# Patient Record
Sex: MALE | Employment: STUDENT | URBAN - METROPOLITAN AREA
[De-identification: names, ages, dates, MRNs, and addresses within clinical notes are randomized per-mention and may not be internally consistent; named-entity substitution may affect disease eponyms.]

---
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
## 2020-07-13 NOTE — PATIENT DISCUSSION
GLAUCOMA:  I have talked with the patient about my impressions, explained our treatment plan, and have answered all questions and patient understands. Continue present eye drops. Add Timolol OS BID. Patient to follow up with me in 6 months.

## 2020-07-15 ENCOUNTER — OFFICE VISIT (OUTPATIENT)
Dept: ORTHOPEDIC SURGERY | Age: 20
End: 2020-07-15

## 2020-07-15 VITALS
RESPIRATION RATE: 16 BRPM | BODY MASS INDEX: 21.68 KG/M2 | OXYGEN SATURATION: 99 % | SYSTOLIC BLOOD PRESSURE: 122 MMHG | TEMPERATURE: 98.4 F | WEIGHT: 178 LBS | HEART RATE: 80 BPM | HEIGHT: 76 IN | DIASTOLIC BLOOD PRESSURE: 75 MMHG

## 2020-07-15 DIAGNOSIS — M25.511 RIGHT SHOULDER PAIN, UNSPECIFIED CHRONICITY: ICD-10-CM

## 2020-07-15 DIAGNOSIS — G25.89 SCAPULAR DYSKINESIS: Primary | ICD-10-CM

## 2020-07-15 RX ORDER — MELOXICAM 15 MG/1
15 TABLET ORAL
Qty: 30 TAB | Refills: 1 | Status: SHIPPED | OUTPATIENT
Start: 2020-07-15

## 2020-07-15 RX ORDER — BACLOFEN 10 MG/1
10 TABLET ORAL 3 TIMES DAILY
Qty: 60 TAB | Refills: 1 | Status: SHIPPED | OUTPATIENT
Start: 2020-07-15

## 2020-07-15 NOTE — PROGRESS NOTES
Chuyita Lopez  2000   Chief Complaint   Patient presents with    Shoulder Pain     right shoulder pain        HISTORY OF PRESENT ILLNESS  Chuyita Lopez is a 20 y.o. male who presents today for evaluation of right shoulder pain. Patient rates pain as 4/10 today. Pain has been present for 2 weeks. Pt denies any specific injury. Pt reports popping and grinding sensations. Has been taking Ibuprofen with some relief. Pt notes pain with driving, [place] and [place] pain with this. He localizes the pain to the shoulder blade region. Patient denies any fever, chills, chest pain, shortness of breath or calf pain. The remainder of the review of systems is negative. There are no new illness or injuries to report since last seen in the office. There are no changes to medications, allergies, family or social history. Pain Assessment  7/15/2020   Location of Pain Shoulder   Location Modifiers Right   Severity of Pain 4   Quality of Pain Popping;Grinding; Throbbing   Duration of Pain A few hours   Frequency of Pain Intermittent   Aggravating Factors Bending;Stretching;Straightening   Limiting Behavior -   Relieving Factors Nothing   Result of Injury No   Work-Related Injury -   Type of Injury -       PHYSICAL EXAM:   Visit Vitals  /75 (BP 1 Location: Left arm, BP Patient Position: Sitting)   Pulse 80   Temp 98.4 °F (36.9 °C) (Oral)   Resp 16   Ht 6' 4\" (1.93 m)   Wt 178 lb (80.7 kg)   SpO2 99%   BMI 21.67 kg/m²     The patient is a well-developed, well-nourished male   in no acute distress. The patient is alert and oriented times three. The patient is alert and oriented times three. Mood and affect are normal.  LYMPHATIC: lymph nodes are not enlarged and are within normal limits  SKIN: normal in color and non tender to palpation. There are no bruises or abrasions noted. NEUROLOGICAL: Motor sensory exam is within normal limits. Reflexes are equal bilaterally.  There is normal sensation to pinprick and light touch  MUSCULOSKELETAL:   Examination Right shoulder   Skin Intact   AC joint tenderness -   Biceps tenderness -   Forward flexion/Elevation    Active abduction    Glenohumeral abduction 90   External rotation ROM 90   Internal rotation ROM 70   Apprehension -   Janets Relocation -   Jerk -   Load and Shift -   Obriens -   Speeds -   Impingement sign -   Supraspinatus/Empty Can -, 5/5   External Rotation Strength -, 5/5   Lift Off/Belly Press -, 5/5   Neurovascular Intact     Examination Neck   Skin Intact   Tenderness, Paracervical +   Paracervical spasms  +   Flexion Decreased 25%   Extension Decreased 25%   Lateral bend left Normal   Lateral bend right Normal   Masses -   Spurling sign -   Biceps reflex Normal   Triceps reflex Normal   Brachioradialis reflex Normal   Sensation Normal   Slight winging of the scapula on the right side with    Scapular muscle spasms    IMAGING: XR of right shoulder obtained in the office dated 7/15/2020 was reviewed and read by Dr. Cale Yancey: No acute abnormalities       IMPRESSION:      ICD-10-CM ICD-9-CM    1. Scapular dyskinesis  G25.89 781.3 meloxicam (Mobic) 15 mg tablet      baclofen (LIORESAL) 10 mg tablet      REFERRAL TO PHYSICAL THERAPY   2. Right shoulder pain, unspecified chronicity  M25.511 719.41 AMB POC XRAY, SHOULDER; COMPLETE, 2+        PLAN:   1. Pt presents today with right shoulder pain likely coming from scapular dyskinesis and I would like for him to try PT with dry needling. Was also given prescriptions for Mobic and Baclofen today. Will see him back in 3 weeks. Risk factors include: n/a   2. No ultrasound exam indicated today  3. No cortisone injection indicated today   4. Yes Physical/Occupational Therapy indicated today  5. No diagnostic test indicated today:   6. No durable medical equipment indicated today  7. No referral indicated today   8. Yes medications indicated today: MOBIC & BACLOFEN  9.  No Narcotic indicated today       RTC 3 weeks      Scribed by Sriram Sinha [address]) as dictated by Carl Camarillo MD    I, Dr. Carl Camarillo, confirm that all documentation is accurate.     Carl Camarillo M.D.   [facility]

## 2020-07-15 NOTE — PROGRESS NOTES
Verbal order with read back given by Dr. Elvie Lozano to sign order for PT entered by Leonora Dinero ([address]).

## 2020-11-06 ENCOUNTER — IMPORTED ENCOUNTER (OUTPATIENT)
Dept: URBAN - NONMETROPOLITAN AREA CLINIC 1 | Facility: CLINIC | Age: 20
End: 2020-11-06

## 2020-11-06 PROBLEM — H52.03: Noted: 2020-11-06

## 2020-11-06 PROBLEM — H52.223: Noted: 2020-11-06

## 2020-11-06 PROCEDURE — 92004 COMPRE OPH EXAM NEW PT 1/>: CPT

## 2020-11-06 PROCEDURE — 92015 DETERMINE REFRACTIVE STATE: CPT

## 2020-11-06 NOTE — PATIENT DISCUSSION
Hyperopia/Astigmatism - new spectacle Rx issued - continue to monitor yearly; 's Notes: PCP Jaleesa Ayoub

## 2021-05-11 NOTE — PATIENT DISCUSSION
Stopped Today: neomycin-polymyxin-dexameth (neomycin-polymyxin-dexameth): ointment: 3.5-10,000-0.1 mg-unit/g-%

## 2021-05-11 NOTE — PATIENT DISCUSSION
New Prescription: neomycin-polymyxin-dexameth (neomycin-polymyxin-dexameth): ointment: 3.5-10,000-0.1 mg-unit/g-% 1 a small amount once a day on eyelid 05-

## 2021-08-04 NOTE — PATIENT DISCUSSION
GLAUCOMA SUSPECT-C/D: The patient is a glaucoma suspect because of suspicious appearance of the optic disc(s) OS>OD. IOP WNL today 19/20. (-) Family hx. RTC for HVF/PACH/OCT RNFL.

## 2022-03-26 NOTE — TELEPHONE ENCOUNTER
Pt's mom called stating the  at pt's school would like a statement from Dr. Mike Infante stating that pt is able to do light practice. Please advise Abi Schroeder @ [phone] when note is ready for . [email or web address]

## 2022-04-09 ASSESSMENT — TONOMETRY
OS_IOP_MMHG: 15
OD_IOP_MMHG: 15

## 2022-04-09 ASSESSMENT — VISUAL ACUITY
OU_SC: 20/20
OD_CC: 20/15
OS_CC: 20/15

## 2022-07-01 NOTE — PATIENT DISCUSSION
Discussed she is ok to continue follow up care yearly. Discussed continuation of care with a general ophthalmologist. Could see Dr. Cameron Junior or Dr. Jennifer Manning.

## 2022-07-12 NOTE — PATIENT DISCUSSION
Discussed she is ok to continue follow up care yearly. Discussed continuation of care with a general ophthalmologist. Could see Dr. Anna Blackburn or Dr. Rio Michael.

## 2022-08-10 NOTE — PATIENT DISCUSSION
Discussed with patient that glaucoma is a lifelong disease and that lack of follow up visits will lead to permanent and irreversible blindness. Patient expressed understanding.

## 2022-08-10 NOTE — PATIENT DISCUSSION
GLAUCOMA SUSPECT-C/D: The patient is a glaucoma suspect because of suspicious appearance of the optic disc(s) OS>OD. IOP borderline today 20/20. (-) Family hx. RTC for HVF/OCT RNFL as scheduled.

## 2022-09-06 NOTE — PATIENT DISCUSSION
OCT RNFL (09/2022) WNL OU. HVF (09/2022) slightly unreliable, does not appear glaucomatous OU. Will call patient with results. Keep scheduled appointment.

## 2023-04-28 ENCOUNTER — ESTABLISHED PATIENT (OUTPATIENT)
Dept: RURAL CLINIC 1 | Facility: CLINIC | Age: 23
End: 2023-04-28

## 2023-04-28 DIAGNOSIS — H52.03: ICD-10-CM

## 2023-04-28 DIAGNOSIS — H52.223: ICD-10-CM

## 2023-04-28 PROCEDURE — 92014 COMPRE OPH EXAM EST PT 1/>: CPT

## 2023-04-28 PROCEDURE — 92015 DETERMINE REFRACTIVE STATE: CPT

## 2023-04-28 ASSESSMENT — VISUAL ACUITY
OD_CC: 20/20
OS_CC: 20/20

## 2023-04-28 ASSESSMENT — TONOMETRY
OD_IOP_MMHG: 17
OS_IOP_MMHG: 17

## 2025-05-09 NOTE — TELEPHONE ENCOUNTER
Occupational Therapy    Patient not seen in therapy.     Patient politely asked occupational therapy to return later. Will follow up later schedule permitting                                Therapy procedure time and total treatment time can be found documented on the Time Entry flowsheet   Spoke with patients mother, she is aware that the letter has been written and may be picked up at any of our locations.